# Patient Record
Sex: FEMALE | Race: OTHER | NOT HISPANIC OR LATINO
[De-identification: names, ages, dates, MRNs, and addresses within clinical notes are randomized per-mention and may not be internally consistent; named-entity substitution may affect disease eponyms.]

---

## 2020-05-06 ENCOUNTER — APPOINTMENT (OUTPATIENT)
Dept: ANTEPARTUM | Facility: CLINIC | Age: 40
End: 2020-05-06
Payer: MEDICAID

## 2020-05-06 ENCOUNTER — ASOB RESULT (OUTPATIENT)
Age: 40
End: 2020-05-06

## 2020-05-06 PROCEDURE — 76813 OB US NUCHAL MEAS 1 GEST: CPT

## 2020-05-06 PROCEDURE — 99201 OFFICE OUTPATIENT NEW 10 MINUTES: CPT | Mod: TH,25

## 2020-05-06 PROCEDURE — 76801 OB US < 14 WKS SINGLE FETUS: CPT

## 2020-05-06 PROCEDURE — 36416 COLLJ CAPILLARY BLOOD SPEC: CPT

## 2020-06-03 ENCOUNTER — ASOB RESULT (OUTPATIENT)
Age: 40
End: 2020-06-03

## 2020-06-03 ENCOUNTER — APPOINTMENT (OUTPATIENT)
Dept: ANTEPARTUM | Facility: CLINIC | Age: 40
End: 2020-06-03
Payer: MEDICAID

## 2020-06-03 PROBLEM — Z00.00 ENCOUNTER FOR PREVENTIVE HEALTH EXAMINATION: Status: ACTIVE | Noted: 2020-06-03

## 2020-06-03 PROCEDURE — 99213 OFFICE O/P EST LOW 20 MIN: CPT | Mod: TH,25

## 2020-06-03 PROCEDURE — 76815 OB US LIMITED FETUS(S): CPT

## 2020-07-01 ENCOUNTER — APPOINTMENT (OUTPATIENT)
Dept: ANTEPARTUM | Facility: CLINIC | Age: 40
End: 2020-07-01
Payer: MEDICAID

## 2020-07-01 ENCOUNTER — ASOB RESULT (OUTPATIENT)
Age: 40
End: 2020-07-01

## 2020-07-01 PROCEDURE — 76811 OB US DETAILED SNGL FETUS: CPT

## 2020-09-17 ENCOUNTER — ASOB RESULT (OUTPATIENT)
Age: 40
End: 2020-09-17

## 2020-09-17 ENCOUNTER — APPOINTMENT (OUTPATIENT)
Dept: ANTEPARTUM | Facility: CLINIC | Age: 40
End: 2020-09-17
Payer: MEDICAID

## 2020-09-17 PROCEDURE — 76819 FETAL BIOPHYS PROFIL W/O NST: CPT

## 2020-09-17 PROCEDURE — 76816 OB US FOLLOW-UP PER FETUS: CPT

## 2020-10-27 ENCOUNTER — OUTPATIENT (OUTPATIENT)
Dept: OUTPATIENT SERVICES | Facility: HOSPITAL | Age: 40
LOS: 1 days | End: 2020-10-27

## 2020-10-27 VITALS
HEIGHT: 64 IN | OXYGEN SATURATION: 99 % | RESPIRATION RATE: 16 BRPM | SYSTOLIC BLOOD PRESSURE: 120 MMHG | HEART RATE: 75 BPM | TEMPERATURE: 98 F | DIASTOLIC BLOOD PRESSURE: 80 MMHG | WEIGHT: 164.91 LBS

## 2020-10-27 DIAGNOSIS — Z98.891 HISTORY OF UTERINE SCAR FROM PREVIOUS SURGERY: Chronic | ICD-10-CM

## 2020-10-27 DIAGNOSIS — Z98.82 BREAST IMPLANT STATUS: Chronic | ICD-10-CM

## 2020-10-27 LAB
APPEARANCE UR: SIGNIFICANT CHANGE UP
BACTERIA # UR AUTO: HIGH
BILIRUB UR-MCNC: NEGATIVE — SIGNIFICANT CHANGE UP
BLD GP AB SCN SERPL QL: NEGATIVE — SIGNIFICANT CHANGE UP
BLOOD UR QL VISUAL: NEGATIVE — SIGNIFICANT CHANGE UP
COLOR SPEC: SIGNIFICANT CHANGE UP
GLUCOSE UR-MCNC: NEGATIVE — SIGNIFICANT CHANGE UP
HCT VFR BLD CALC: 36.2 % — SIGNIFICANT CHANGE UP (ref 34.5–45)
HGB BLD-MCNC: 11.3 G/DL — LOW (ref 11.5–15.5)
HYALINE CASTS # UR AUTO: SIGNIFICANT CHANGE UP
KETONES UR-MCNC: NEGATIVE — SIGNIFICANT CHANGE UP
LEUKOCYTE ESTERASE UR-ACNC: NEGATIVE — SIGNIFICANT CHANGE UP
MCHC RBC-ENTMCNC: 30.2 PG — SIGNIFICANT CHANGE UP (ref 27–34)
MCHC RBC-ENTMCNC: 31.2 % — LOW (ref 32–36)
MCV RBC AUTO: 96.8 FL — SIGNIFICANT CHANGE UP (ref 80–100)
NITRITE UR-MCNC: NEGATIVE — SIGNIFICANT CHANGE UP
NRBC # FLD: 0 K/UL — SIGNIFICANT CHANGE UP (ref 0–0)
PH UR: 6.5 — SIGNIFICANT CHANGE UP (ref 5–8)
PLATELET # BLD AUTO: 216 K/UL — SIGNIFICANT CHANGE UP (ref 150–400)
PMV BLD: 10.6 FL — SIGNIFICANT CHANGE UP (ref 7–13)
PROT UR-MCNC: NEGATIVE — SIGNIFICANT CHANGE UP
RBC # BLD: 3.74 M/UL — LOW (ref 3.8–5.2)
RBC # FLD: 13.3 % — SIGNIFICANT CHANGE UP (ref 10.3–14.5)
RBC CASTS # UR COMP ASSIST: SIGNIFICANT CHANGE UP (ref 0–?)
RH IG SCN BLD-IMP: POSITIVE — SIGNIFICANT CHANGE UP
SP GR SPEC: 1.01 — SIGNIFICANT CHANGE UP (ref 1–1.04)
SQUAMOUS # UR AUTO: SIGNIFICANT CHANGE UP
UROBILINOGEN FLD QL: NORMAL — SIGNIFICANT CHANGE UP
WBC # BLD: 6.31 K/UL — SIGNIFICANT CHANGE UP (ref 3.8–10.5)
WBC # FLD AUTO: 6.31 K/UL — SIGNIFICANT CHANGE UP (ref 3.8–10.5)
WBC UR QL: SIGNIFICANT CHANGE UP (ref 0–?)

## 2020-10-27 RX ORDER — OXYTOCIN 10 UNIT/ML
333.33 VIAL (ML) INJECTION
Qty: 20 | Refills: 0 | Status: DISCONTINUED | OUTPATIENT
Start: 2020-11-03 | End: 2020-11-03

## 2020-10-27 NOTE — OB PST NOTE - NSANTHOSAYNRD_GEN_A_CORE
No. ADAN screening performed.  STOP BANG Legend: 0-2 = LOW Risk; 3-4 = INTERMEDIATE Risk; 5-8 = HIGH Risk

## 2020-10-27 NOTE — OB PST NOTE - HISTORY OF PRESENT ILLNESS
40 y/o female 38 weeks pregnant,  38 y/o female 38 weeks pregnant, , [facility]IL[place] 11/10/2020 presents to PST for pre op evaluation in preparation for repeat  section on 2020  Pt denies abnormal vaginal bleeding or leaking  Reports positive fetal movement during PST

## 2020-10-27 NOTE — OB PST NOTE - NSHPPHYSICALEXAM_GEN_ALL_CORE
Constitutional: Well Developed, Well Groomed, Well Nourished, No Distress    Eyes: PERRL, EOMI, conjunctiva clear    Ears: Normal    Mouth & Gums: Normal, moist    Pharynx: No tenderness, discharge, or peritonsillar abscess    Tonsils: No Redness, discharge, tenderness, or swelling    Neck: Supple, no JVD, normal thyroid glands, no carotid bruits, no cervical vertebral or paraspinal tenderness    Breast: Normal shape, no masses, no tenderness, nipples normal, no nipple discharge    Back: Normal shape, ROM intact, strength intact, no vertebral tenderness    Respiratory: Airway patent, breath sounds equal, good air movement, respiration non-labored, clear to auscultation bilateral, no chest wall tenderness, no intercostal retractions, no rales, no wheezes, no rhonchi, no subcutaneous emphysema    Cardiovascular:  Regular rate and rhythm, no rubs or murmur, normal PMI    Gastrointestinal: Soft, non-tender, non distention, no masses palpable, bowel sound normal, no bruit, no rebound tenderness    Extremities: No clubbing, cyanosis, or pedal edema    Vascular:  Carotid Pulse normal , Radial Pulse normal, Femoral Pulse normal, DP pulse normal, PT pulse normal    Neurological: alert & oriented x 3, sensation intact, deep reflexes intact, cranial nerve intact, normal strength    Skin: warm and dry, normal color    Lymph Nodes: normal posterior cervical lymph node, normal anterior cervical lymph node, normal supraclavicular lymph node, normal axillary lymph node, normal inguinal lymph node, normal femoral lymph node    Musculoskeletal: BLE mild edema  ROM intact, warmth, or calf tenderness. Normal strength    Psychiatric: normal affect, normal behavior

## 2020-10-27 NOTE — OB PST NOTE - PROBLEM SELECTOR PLAN 1
Scheduled for repeat  section, bilateral tubal ligation on 2020. Pre op instructions, chlorhexidine gluconate soap given and explained. Pt verbalized understanding.   Pt confirmed appt for Covid-19 swab pre op

## 2020-10-27 NOTE — OB PST NOTE - NSHPREVIEWOFSYSTEMS_GEN_ALL_CORE
General: No fever, chills, sweating, anorexia, weight loss or weight gain. No polyphagia, polyurea, polydypsia, malaise, or fatigue    Skin: No rashes, itching, or dryness. No change in size/color of moles. No tumors, brittle nails, pitted nails, or hair loss    Breast: No tenderness, lumps, or nipple discharge      Ophthalmologic: No diplopia, photophobia, lacrimation, blurred Vision , or eye discharge    ENMT Symptoms: No hearing difficulty, ear pain, tinnitus, or vertigo. No sinus symptoms, nasal congestion, nasal   discharge, or nasal obstruction    Respiratory and Thorax: No wheezing, dyspnea, cough, hemoptysis, or pleuritic chest pPain     Cardiovascular: No chest pain, palpitations, dyspnea on exertion, orthopnea, paroxysmal nocturnal dyspnea,   peripheral edema, or claudication    Gastrointestinal: No nausea, vomiting, diarrhea, constipation, change in bowel habits, flatulence, abdominal pain, or melena    Genitourinary/ Pelvis: No hematuria, renal colic, or flank pain.  No urine discoloration, incontinence, dysuria, or urinary hesitancy. Normal urinary frequency. No nocturia, abnormal vaginal bleeding, vaginal discharge, spotting, pelvic pain, or vaginal leakage    Musculoskeletal: No arthralgia, arthritis, joint swelling, muscle cramping, muscle weakness, neck pain, arm pain, or leg pain    Neurological: No transient paralysis, weakness, paresthesias, or seizures. No syncope, tremors, vertigo, loss of sensation, difficulty walking, loss of consciousness, hemiparesis, confusion, or facial palsy    Psychiatric: No suicidal ideation, depression, anxiety, insomnia, memory loss, paranoia, mood swings, agitation, hallucinations, or hyperactivity    Hematology: No gum bleeding, nose bleeding, or skin lumps    Lymphatic: No enlarged or tender lymph nodes. No extremity swelling    Endocrine: No heat or cold intolerance    Immunologic: No recurrent or persistent infections General: No fever, chills, sweating, anorexia, weight loss or weight gain. No polyphagia, polyurea, polydypsia, malaise, or fatigue    Skin: No rashes, itching, or dryness. No change in size/color of moles. No tumors, brittle nails, pitted nails, or hair loss    Breast: No tenderness, lumps, or nipple discharge      Ophthalmologic: No diplopia, photophobia, lacrimation, blurred Vision , or eye discharge    ENMT Symptoms: No hearing difficulty, ear pain, tinnitus, or vertigo. No sinus symptoms, nasal congestion, nasal   discharge, or nasal obstruction    Respiratory and Thorax: No wheezing, dyspnea, cough, hemoptysis, or pleuritic chest pain     Cardiovascular: No chest pain, palpitations, dyspnea on exertion, orthopnea, paroxysmal nocturnal dyspnea,   peripheral edema, or claudication    Gastrointestinal: No nausea, vomiting, diarrhea, constipation, change in bowel habits, flatulence, abdominal pain, or melena    Genitourinary/ Pelvis: No hematuria, renal colic, or flank pain.  No urine discoloration, incontinence, dysuria, or urinary hesitancy. Normal urinary frequency. No nocturia, abnormal vaginal bleeding, vaginal discharge, spotting, pelvic pain, or vaginal leakage    Musculoskeletal: + varicose veins, No arthralgia, arthritis, joint swelling, muscle cramping, muscle weakness, neck pain, arm pain, or leg pain    Neurological: No transient paralysis, weakness, paresthesias, or seizures. No syncope, tremors, vertigo, loss of sensation, difficulty walking, loss of consciousness, hemiparesis, confusion, or facial palsy    Psychiatric: No suicidal ideation, depression, anxiety, insomnia, memory loss, paranoia, mood swings, agitation, hallucinations, or hyperactivity    Hematology: No gum bleeding, nose bleeding, or skin lumps    Lymphatic: No enlarged or tender lymph nodes. No extremity swelling    Endocrine: No heat or cold intolerance    Immunologic: No recurrent or persistent infections

## 2020-10-31 ENCOUNTER — APPOINTMENT (OUTPATIENT)
Dept: DISASTER EMERGENCY | Facility: CLINIC | Age: 40
End: 2020-10-31

## 2020-10-31 DIAGNOSIS — Z01.818 ENCOUNTER FOR OTHER PREPROCEDURAL EXAMINATION: ICD-10-CM

## 2020-11-01 LAB — SARS-COV-2 N GENE NPH QL NAA+PROBE: NOT DETECTED

## 2020-11-02 ENCOUNTER — TRANSCRIPTION ENCOUNTER (OUTPATIENT)
Age: 40
End: 2020-11-02

## 2020-11-03 ENCOUNTER — RESULT REVIEW (OUTPATIENT)
Age: 40
End: 2020-11-03

## 2020-11-03 ENCOUNTER — TRANSCRIPTION ENCOUNTER (OUTPATIENT)
Age: 40
End: 2020-11-03

## 2020-11-03 ENCOUNTER — INPATIENT (INPATIENT)
Facility: HOSPITAL | Age: 40
LOS: 1 days | Discharge: HOME | End: 2020-11-05
Attending: STUDENT IN AN ORGANIZED HEALTH CARE EDUCATION/TRAINING PROGRAM | Admitting: STUDENT IN AN ORGANIZED HEALTH CARE EDUCATION/TRAINING PROGRAM
Payer: MEDICAID

## 2020-11-03 VITALS — HEART RATE: 75 BPM | SYSTOLIC BLOOD PRESSURE: 133 MMHG | DIASTOLIC BLOOD PRESSURE: 83 MMHG

## 2020-11-03 DIAGNOSIS — Z98.891 HISTORY OF UTERINE SCAR FROM PREVIOUS SURGERY: Chronic | ICD-10-CM

## 2020-11-03 DIAGNOSIS — Z98.82 BREAST IMPLANT STATUS: Chronic | ICD-10-CM

## 2020-11-03 LAB
BASOPHILS # BLD AUTO: 0.02 K/UL — SIGNIFICANT CHANGE UP (ref 0–0.2)
BASOPHILS NFR BLD AUTO: 0.3 % — SIGNIFICANT CHANGE UP (ref 0–2)
BLD GP AB SCN SERPL QL: NEGATIVE — SIGNIFICANT CHANGE UP
EOSINOPHIL # BLD AUTO: 0.03 K/UL — SIGNIFICANT CHANGE UP (ref 0–0.5)
EOSINOPHIL NFR BLD AUTO: 0.4 % — SIGNIFICANT CHANGE UP (ref 0–6)
HCT VFR BLD CALC: 39.6 % — SIGNIFICANT CHANGE UP (ref 34.5–45)
HGB BLD-MCNC: 12.3 G/DL — SIGNIFICANT CHANGE UP (ref 11.5–15.5)
IMM GRANULOCYTES NFR BLD AUTO: 0.4 % — SIGNIFICANT CHANGE UP (ref 0–1.5)
LYMPHOCYTES # BLD AUTO: 1.17 K/UL — SIGNIFICANT CHANGE UP (ref 1–3.3)
LYMPHOCYTES # BLD AUTO: 15.8 % — SIGNIFICANT CHANGE UP (ref 13–44)
MCHC RBC-ENTMCNC: 29.9 PG — SIGNIFICANT CHANGE UP (ref 27–34)
MCHC RBC-ENTMCNC: 31.1 % — LOW (ref 32–36)
MCV RBC AUTO: 96.4 FL — SIGNIFICANT CHANGE UP (ref 80–100)
MONOCYTES # BLD AUTO: 0.4 K/UL — SIGNIFICANT CHANGE UP (ref 0–0.9)
MONOCYTES NFR BLD AUTO: 5.4 % — SIGNIFICANT CHANGE UP (ref 2–14)
NEUTROPHILS # BLD AUTO: 5.77 K/UL — SIGNIFICANT CHANGE UP (ref 1.8–7.4)
NEUTROPHILS NFR BLD AUTO: 77.7 % — HIGH (ref 43–77)
NRBC # FLD: 0 K/UL — SIGNIFICANT CHANGE UP (ref 0–0)
PLATELET # BLD AUTO: 215 K/UL — SIGNIFICANT CHANGE UP (ref 150–400)
PMV BLD: 10.3 FL — SIGNIFICANT CHANGE UP (ref 7–13)
RBC # BLD: 4.11 M/UL — SIGNIFICANT CHANGE UP (ref 3.8–5.2)
RBC # FLD: 13.3 % — SIGNIFICANT CHANGE UP (ref 10.3–14.5)
RH IG SCN BLD-IMP: POSITIVE — SIGNIFICANT CHANGE UP
T PALLIDUM AB TITR SER: NEGATIVE — SIGNIFICANT CHANGE UP
WBC # BLD: 7.42 K/UL — SIGNIFICANT CHANGE UP (ref 3.8–10.5)
WBC # FLD AUTO: 7.42 K/UL — SIGNIFICANT CHANGE UP (ref 3.8–10.5)

## 2020-11-03 PROCEDURE — 88302 TISSUE EXAM BY PATHOLOGIST: CPT | Mod: 26

## 2020-11-03 RX ORDER — CITRIC ACID/SODIUM CITRATE 300-500 MG
30 SOLUTION, ORAL ORAL ONCE
Refills: 0 | Status: COMPLETED | OUTPATIENT
Start: 2020-11-03 | End: 2020-11-03

## 2020-11-03 RX ORDER — FAMOTIDINE 10 MG/ML
20 INJECTION INTRAVENOUS ONCE
Refills: 0 | Status: COMPLETED | OUTPATIENT
Start: 2020-11-03 | End: 2020-11-03

## 2020-11-03 RX ORDER — HEPARIN SODIUM 5000 [USP'U]/ML
5000 INJECTION INTRAVENOUS; SUBCUTANEOUS EVERY 12 HOURS
Refills: 0 | Status: DISCONTINUED | OUTPATIENT
Start: 2020-11-03 | End: 2020-11-05

## 2020-11-03 RX ORDER — FERROUS SULFATE 325(65) MG
325 TABLET ORAL DAILY
Refills: 0 | Status: DISCONTINUED | OUTPATIENT
Start: 2020-11-03 | End: 2020-11-05

## 2020-11-03 RX ORDER — MAGNESIUM HYDROXIDE 400 MG/1
30 TABLET, CHEWABLE ORAL
Refills: 0 | Status: DISCONTINUED | OUTPATIENT
Start: 2020-11-03 | End: 2020-11-05

## 2020-11-03 RX ORDER — SIMETHICONE 80 MG/1
80 TABLET, CHEWABLE ORAL EVERY 4 HOURS
Refills: 0 | Status: DISCONTINUED | OUTPATIENT
Start: 2020-11-03 | End: 2020-11-05

## 2020-11-03 RX ORDER — LANOLIN
1 OINTMENT (GRAM) TOPICAL EVERY 6 HOURS
Refills: 0 | Status: DISCONTINUED | OUTPATIENT
Start: 2020-11-03 | End: 2020-11-05

## 2020-11-03 RX ORDER — SODIUM CHLORIDE 9 MG/ML
1000 INJECTION, SOLUTION INTRAVENOUS
Refills: 0 | Status: DISCONTINUED | OUTPATIENT
Start: 2020-11-03 | End: 2020-11-03

## 2020-11-03 RX ORDER — OXYCODONE HYDROCHLORIDE 5 MG/1
5 TABLET ORAL ONCE
Refills: 0 | Status: DISCONTINUED | OUTPATIENT
Start: 2020-11-03 | End: 2020-11-05

## 2020-11-03 RX ORDER — SODIUM CHLORIDE 9 MG/ML
1000 INJECTION, SOLUTION INTRAVENOUS ONCE
Refills: 0 | Status: COMPLETED | OUTPATIENT
Start: 2020-11-03 | End: 2020-11-03

## 2020-11-03 RX ORDER — CHOLECALCIFEROL (VITAMIN D3) 125 MCG
0 CAPSULE ORAL
Qty: 0 | Refills: 0 | DISCHARGE

## 2020-11-03 RX ORDER — IBUPROFEN 200 MG
600 TABLET ORAL EVERY 6 HOURS
Refills: 0 | Status: COMPLETED | OUTPATIENT
Start: 2020-11-03 | End: 2021-10-02

## 2020-11-03 RX ORDER — TETANUS TOXOID, REDUCED DIPHTHERIA TOXOID AND ACELLULAR PERTUSSIS VACCINE, ADSORBED 5; 2.5; 8; 8; 2.5 [IU]/.5ML; [IU]/.5ML; UG/.5ML; UG/.5ML; UG/.5ML
0.5 SUSPENSION INTRAMUSCULAR ONCE
Refills: 0 | Status: DISCONTINUED | OUTPATIENT
Start: 2020-11-03 | End: 2020-11-05

## 2020-11-03 RX ORDER — SENNA PLUS 8.6 MG/1
1 TABLET ORAL
Refills: 0 | Status: DISCONTINUED | OUTPATIENT
Start: 2020-11-03 | End: 2020-11-05

## 2020-11-03 RX ORDER — METOCLOPRAMIDE HCL 10 MG
10 TABLET ORAL ONCE
Refills: 0 | Status: COMPLETED | OUTPATIENT
Start: 2020-11-03 | End: 2020-11-03

## 2020-11-03 RX ORDER — KETOROLAC TROMETHAMINE 30 MG/ML
30 SYRINGE (ML) INJECTION EVERY 6 HOURS
Refills: 0 | Status: DISCONTINUED | OUTPATIENT
Start: 2020-11-03 | End: 2020-11-04

## 2020-11-03 RX ORDER — OXYCODONE HYDROCHLORIDE 5 MG/1
5 TABLET ORAL
Refills: 0 | Status: DISCONTINUED | OUTPATIENT
Start: 2020-11-03 | End: 2020-11-05

## 2020-11-03 RX ORDER — DIPHENHYDRAMINE HCL 50 MG
25 CAPSULE ORAL EVERY 6 HOURS
Refills: 0 | Status: DISCONTINUED | OUTPATIENT
Start: 2020-11-03 | End: 2020-11-05

## 2020-11-03 RX ORDER — OXYTOCIN 10 UNIT/ML
333.33 VIAL (ML) INJECTION
Qty: 20 | Refills: 0 | Status: DISCONTINUED | OUTPATIENT
Start: 2020-11-03 | End: 2020-11-04

## 2020-11-03 RX ORDER — ACETAMINOPHEN 500 MG
975 TABLET ORAL
Refills: 0 | Status: DISCONTINUED | OUTPATIENT
Start: 2020-11-03 | End: 2020-11-05

## 2020-11-03 RX ADMIN — Medication 975 MILLIGRAM(S): at 23:28

## 2020-11-03 RX ADMIN — Medication 30 MILLIGRAM(S): at 19:33

## 2020-11-03 RX ADMIN — Medication 10 MILLIGRAM(S): at 11:50

## 2020-11-03 RX ADMIN — SODIUM CHLORIDE 125 MILLILITER(S): 9 INJECTION, SOLUTION INTRAVENOUS at 11:51

## 2020-11-03 RX ADMIN — FAMOTIDINE 20 MILLIGRAM(S): 10 INJECTION INTRAVENOUS at 11:51

## 2020-11-03 RX ADMIN — Medication 30 MILLILITER(S): at 11:50

## 2020-11-03 RX ADMIN — SODIUM CHLORIDE 2000 MILLILITER(S): 9 INJECTION, SOLUTION INTRAVENOUS at 11:51

## 2020-11-03 NOTE — OB NEONATOLOGY/PEDIATRICIAN DELIVERY SUMMARY - NSPEDSNEONOTESA_OBGYN_ALL_OB_FT
39 wk male born via repeat CS to a 38 y/o  blood type O+ mother. Maternal history of breast augmentation. No significant prenatal history. PNL -/-/NR/I, GBS unknown. AROM at 1412 with clear fluids. Baby emerged vigorous, crying, was w/d/s/s with APGARS of 7/8. Mom plans to initiate breastfeeding, consents Hep B vaccine and declines circ. EOS 0.03. 39 wk male born via repeat CS to a 38 y/o  blood type O+ mother. Maternal history of breast augmentation. No significant prenatal history. PNL -/-/NR/I, GBS unknown. AROM at 1412 with clear fluids. Baby emerged with poor tone, minimal crying, was w/d/s/s with APGARS of 7/8. Mom plans to initiate breastfeeding, consents Hep B vaccine and declines circ. EOS 0.03.

## 2020-11-03 NOTE — DISCHARGE NOTE OB - PATIENT PORTAL LINK FT
You can access the FollowMyHealth Patient Portal offered by [facility] by registering at the following website: [email or web address]. By joining [employer]’s FollowMyHealth portal, you will also be able to view your health information using other applications (apps) compatible with our system.

## 2020-11-03 NOTE — DISCHARGE NOTE OB - MEDICATION SUMMARY - MEDICATIONS TO TAKE
I will START or STAY ON the medications listed below when I get home from the hospital:    prenatal  -- 2 gummies by mouth daily  -- Indication: For postpartum   I will START or STAY ON the medications listed below when I get home from the hospital:    prenatal  -- 2 gummies by mouth daily  -- Indication: For postpartum    ibuprofen 600 mg oral tablet  -- 1 tab(s) by mouth every 6 hours  -- Indication: For pain, as needed

## 2020-11-03 NOTE — OB PROVIDER DELIVERY SUMMARY - NSPROVIDERDELIVERYNOTE_OBGYN_ALL_OB_FT
repeat LTCS, bilateral tubal ligation   viable male infant, vertex presentation, head extedned at delivery, difficulty delivering  2970 Apgars 7/8, cord gasses sent  Grossly normal fallopian tubes, uterus, and ovaries    QBL: 476   IVF: 1700  UOP: 225    [facility] PGY2  w/ Dr. Spence Male  delivered in Vertex position, APGARS 7/8, 2970g. Difficult delivery.  noted to be in direct OP position with fetal head extension. Delivery achieved with flexion of the head. Adhesions noted between the anterior abdominal wall and the anterior aspect of the uterus. Unable to exteriorize the uterus. Hysterotomy closed with 1 layer and figure of eight sutures were performed for hemostasis. Patient noted to have persistent oozing and received TXA. Modified Aj technique performed. Ends of the fallopian tubes were suture ligated and cauterized. To placed over Hysterotomy for added Hemostasis.     QBL: 476   IVF: 1700  UOP: 225

## 2020-11-03 NOTE — OB PROVIDER H&P - ASSESSMENT
39y , EGA@39 weeks, presented for scheduled repeat  section with BTL.  Patient denies contractions//vaginal bleeding/ leakage of fluid, and reports fetal movement.  Denies fever, chills, headaches, changes in vision, chest pain, palpitations, shortness of breath, cough, nausea, vomiting, diarrhea, constipation, urinary symptoms, edema.    Prenatal care with Dr. Fish.  Prenatal course is uncomplicated.  GBS status is negative.  No adverse reactions to anesthesia, no objections to blood transfusions if clinically indicated.  OB hx: 2020, primary  sections, twins, 38 weeks, boys, [id number]; uncomplicated.            2 TOPs, D&C, uncomplicated  Med hx: denies  Surg hx: ,  section, 2018, breat augmentation  GYN hx: reports small uterine fibroid, denies hx of STDs/abnormal Pap smear  Meds: Prenatal vitamins, FeSO4, Vit V  Allergies; NKDA  Social hx: Denies alcohol, tobacco, drug use    PHYSICAL EXAM  Vital Signs: T(C): 36.9C; HR: 75 (2020 11:11); BP: 133/71  RR: 17  Gen: NAD  Head: NC/AT  Cardio: S1S2+, RRR  Resp: CTABL, no wheezing  Abdomen: Soft, NT/ND, BS+  Extremities: No LE edema bilaterally    FHR: 130 HR baseline, moderate variability, accelerations present, no decelerations, Category 1.  [place]: acontractile        A:  39y , EGA@39 weeks, not in labor; previous  section.     Plan: admit for repeat  section with BTL.    Betty Aden CNM     20 @ 11:15

## 2020-11-03 NOTE — DISCHARGE NOTE OB - CARE PROVIDER_API CALL
Rekha Spence)  Obstetrics and Gynecology Obstetrics and Gynocology  [address] [place] [address]  Phone: [phone]  Fax: [phone]  Established Patient  Follow Up Time: 2 weeks

## 2020-11-03 NOTE — OB RN PATIENT PROFILE - PRO PATERNAL LANGUAGE ASSIST YN
will have his wife translate if he needs an explanation as per /No- Second parent offered and refused free interpretation services

## 2020-11-04 LAB
BASOPHILS # BLD AUTO: 0.01 K/UL — SIGNIFICANT CHANGE UP (ref 0–0.2)
BASOPHILS NFR BLD AUTO: 0.1 % — SIGNIFICANT CHANGE UP (ref 0–2)
EOSINOPHIL # BLD AUTO: 0.01 K/UL — SIGNIFICANT CHANGE UP (ref 0–0.5)
EOSINOPHIL NFR BLD AUTO: 0.1 % — SIGNIFICANT CHANGE UP (ref 0–6)
HCT VFR BLD CALC: 35.1 % — SIGNIFICANT CHANGE UP (ref 34.5–45)
HGB BLD-MCNC: 10.8 G/DL — LOW (ref 11.5–15.5)
IMM GRANULOCYTES NFR BLD AUTO: 0.4 % — SIGNIFICANT CHANGE UP (ref 0–1.5)
LYMPHOCYTES # BLD AUTO: 0.94 K/UL — LOW (ref 1–3.3)
LYMPHOCYTES # BLD AUTO: 8.4 % — LOW (ref 13–44)
MCHC RBC-ENTMCNC: 29.5 PG — SIGNIFICANT CHANGE UP (ref 27–34)
MCHC RBC-ENTMCNC: 30.8 % — LOW (ref 32–36)
MCV RBC AUTO: 95.9 FL — SIGNIFICANT CHANGE UP (ref 80–100)
MONOCYTES # BLD AUTO: 0.86 K/UL — SIGNIFICANT CHANGE UP (ref 0–0.9)
MONOCYTES NFR BLD AUTO: 7.7 % — SIGNIFICANT CHANGE UP (ref 2–14)
NEUTROPHILS # BLD AUTO: 9.34 K/UL — HIGH (ref 1.8–7.4)
NEUTROPHILS NFR BLD AUTO: 83.3 % — HIGH (ref 43–77)
NRBC # FLD: 0 K/UL — SIGNIFICANT CHANGE UP (ref 0–0)
PLATELET # BLD AUTO: 210 K/UL — SIGNIFICANT CHANGE UP (ref 150–400)
PMV BLD: 10.5 FL — SIGNIFICANT CHANGE UP (ref 7–13)
RBC # BLD: 3.66 M/UL — LOW (ref 3.8–5.2)
RBC # FLD: 13.2 % — SIGNIFICANT CHANGE UP (ref 10.3–14.5)
SARS-COV-2 IGG SERPL QL IA: NEGATIVE — SIGNIFICANT CHANGE UP
SARS-COV-2 IGM SERPL IA-ACNC: <0.1 INDEX — SIGNIFICANT CHANGE UP
WBC # BLD: 11.2 K/UL — HIGH (ref 3.8–10.5)
WBC # FLD AUTO: 11.2 K/UL — HIGH (ref 3.8–10.5)

## 2020-11-04 RX ORDER — IBUPROFEN 200 MG
600 TABLET ORAL EVERY 6 HOURS
Refills: 0 | Status: DISCONTINUED | OUTPATIENT
Start: 2020-11-04 | End: 2020-11-05

## 2020-11-04 RX ORDER — IBUPROFEN 200 MG
1 TABLET ORAL
Qty: 0 | Refills: 0 | DISCHARGE
Start: 2020-11-04

## 2020-11-04 RX ADMIN — Medication 975 MILLIGRAM(S): at 23:00

## 2020-11-04 RX ADMIN — Medication 600 MILLIGRAM(S): at 17:35

## 2020-11-04 RX ADMIN — Medication 30 MILLIGRAM(S): at 05:58

## 2020-11-04 RX ADMIN — Medication 975 MILLIGRAM(S): at 00:15

## 2020-11-04 RX ADMIN — Medication 975 MILLIGRAM(S): at 22:17

## 2020-11-04 RX ADMIN — Medication 325 MILLIGRAM(S): at 12:14

## 2020-11-04 RX ADMIN — Medication 975 MILLIGRAM(S): at 12:45

## 2020-11-04 RX ADMIN — Medication 600 MILLIGRAM(S): at 18:05

## 2020-11-04 RX ADMIN — Medication 1 TABLET(S): at 12:14

## 2020-11-04 RX ADMIN — Medication 975 MILLIGRAM(S): at 06:40

## 2020-11-04 RX ADMIN — Medication 30 MILLIGRAM(S): at 06:40

## 2020-11-04 RX ADMIN — Medication 30 MILLIGRAM(S): at 12:15

## 2020-11-04 RX ADMIN — Medication 975 MILLIGRAM(S): at 05:57

## 2020-11-04 RX ADMIN — Medication 30 MILLIGRAM(S): at 12:45

## 2020-11-04 RX ADMIN — Medication 975 MILLIGRAM(S): at 12:15

## 2020-11-04 NOTE — LACTATION INITIAL EVALUATION - LACTATION INTERVENTIONS
initiate hand expression routine/assisted with deep latch and positioning.  reviewed with  mother breastfeeding section  of  postpartum  book.  discussed  signs  of  effective  feeding and  swallowing.  discussed  compression at  breast when  nbn  stops  drinking  and  is  still sucking. instructed  to offer both  breast at a feeding ,feed on cue and safe  skin to skin.  reviewed risks  of  formula  feedng./initiate skin to skin

## 2020-11-04 NOTE — PROGRESS NOTE ADULT - SUBJECTIVE AND OBJECTIVE BOX
Patient seen and examined at bedside, no acute overnight events. No acute concerns, pain well controlled. Patient is ambulating and tolerating regular diet. Has not yet passed flatus. Pt has not yet urinated after Smith was removed. Did have straight catheterization after failed TOV. Denies CP, SOB, N/V, HA, blurred vision, epigastric pain.    Vital Signs Last 24 Hours  T(C): 36.6 (11-04-20 @ 05:51), Max: 36.9 (11-03-20 @ 11:51)  HR: 70 (11-04-20 @ 05:51) (69 - 90)  BP: 120/80 (11-04-20 @ 05:51) (107/94 - 140/77)  RR: 18 (11-04-20 @ 05:51) (15 - 20)  SpO2: 99% (11-04-20 @ 05:51) (98% - 100%)    I&O's Summary    03 Nov 2020 07:01  -  04 Nov 2020 07:00  --------------------------------------------------------  IN: 4456 mL / OUT: 951 mL / NET: 3505 mL        Physical Exam:  General: NAD  Abdomen: Soft, non-tender, non-distended, fundus firm  Incision: Pfannensteil incision CDI, subcuticular suture closure  Pelvic: Lochia wnl    Labs:    Blood Type: O Positive  Antibody Screen: Negative  RPR: Negative               10.8   11.20 )-----------( 210      ( 11-04 @ 05:35 )             35.1                12.3   7.42  )-----------( 215      ( 11-03 @ 11:23 )             39.6                11.3   6.31  )-----------( 216      ( 10-27 @ 18:27 )             36.2         MEDICATIONS  (STANDING):  acetaminophen   Tablet .. 975 milliGRAM(s) Oral <User Schedule>  diphtheria/tetanus/pertussis (acellular) Vaccine (ADAcel) 0.5 milliLiter(s) IntraMuscular once  ferrous    sulfate 325 milliGRAM(s) Oral daily  heparin   Injectable 5000 Unit(s) SubCutaneous every 12 hours  ibuprofen  Tablet. 600 milliGRAM(s) Oral every 6 hours  ketorolac   Injectable 30 milliGRAM(s) IV Push every 6 hours  oxytocin Infusion 333.333 milliUNIT(s)/Min (1000 mL/Hr) IV Continuous <Continuous>  prenatal multivitamin 1 Tablet(s) Oral daily    MEDICATIONS  (PRN):  diphenhydrAMINE 25 milliGRAM(s) Oral every 6 hours PRN Itching  lanolin Ointment 1 Application(s) Topical every 6 hours PRN Sore Nipples  magnesium hydroxide Suspension 30 milliLiter(s) Oral two times a day PRN Constipation  oxyCODONE    IR 5 milliGRAM(s) Oral every 3 hours PRN Moderate to Severe Pain (4-10)  oxyCODONE    IR 5 milliGRAM(s) Oral once PRN Moderate to Severe Pain (4-10)  senna 1 Tablet(s) Oral two times a day PRN Constipation  simethicone 80 milliGRAM(s) Chew every 4 hours PRN Gas

## 2020-11-04 NOTE — PROGRESS NOTE ADULT - SUBJECTIVE AND OBJECTIVE BOX
Pain Service Follow-up  Postop Day  1    S/P  C- Section    T(C): 36.6 (11-04-20 @ 05:51), Max: 36.9 (11-03-20 @ 11:51)  HR: 70 (11-04-20 @ 05:51) (69 - 90)  BP: 120/80 (11-04-20 @ 05:51) (107/94 - 140/77)  RR: 18 (11-04-20 @ 05:51) (15 - 20)  SpO2: 99% (11-04-20 @ 05:51) (98% - 100%)  Wt(kg): --      THERAPY:  Spinal Morphine     Sedation Score:	  [X] Alert	      [  ] Drowsy       [  ] Arousable	[  ] Asleep         [  ] Unresponsive    Side Effects:	  [X] None	      [  ] Nausea       [  ] Pruritus        [  ] Weakness   [  ] Numbness        ASSESSMENT/ PLAN   [ X ] Discontinue         [  ] Continue    [ X ] Documentation and Verification of current medications       Satisfactory Post Anesthetic Course

## 2020-11-04 NOTE — LACTATION INITIAL EVALUATION - INTERVENTION OUTCOME
nbn  sleepy at breast .reviewed strategies to wake  nbn . nbn  less than 24  hours .  rn aware of visit .  offered assistance as needed./verbalizes understanding

## 2020-11-05 VITALS
TEMPERATURE: 98 F | RESPIRATION RATE: 18 BRPM | HEART RATE: 80 BPM | DIASTOLIC BLOOD PRESSURE: 76 MMHG | OXYGEN SATURATION: 100 % | SYSTOLIC BLOOD PRESSURE: 124 MMHG

## 2020-11-05 PROCEDURE — 93970 EXTREMITY STUDY: CPT | Mod: 26

## 2020-11-05 RX ADMIN — Medication 600 MILLIGRAM(S): at 07:05

## 2020-11-05 RX ADMIN — Medication 600 MILLIGRAM(S): at 11:15

## 2020-11-05 RX ADMIN — Medication 600 MILLIGRAM(S): at 06:00

## 2020-11-05 RX ADMIN — Medication 600 MILLIGRAM(S): at 11:45

## 2020-11-05 NOTE — PROGRESS NOTE ADULT - SUBJECTIVE AND OBJECTIVE BOX
NP Provider note:    Patient seen at bedside resting comfortably offers no new complaints. + Ambulation, + void without difficulty, + flatus;  no bm; tolerating regular diet. both breastfeeding and bottle feeding. Bonding well w/ . PT complains of Left leg swelling, and heaviness. Denies pain and weakness to extremity. Denies HA, blurry vision or epigastric pain, CP, SOB, N/V/D,  dizziness, palpitations, worsening vaginal bleeding.    Vital Signs Last 24 Hrs  T(C): 36.9 (2020 05:12), Max: 36.9 (2020 05:12)  T(F): 98.5 (2020 05:12), Max: 98.5 (2020 05:12)  HR: 73 (2020 05:12) (73 - 86)  BP: 118/65 (2020 05:12) (118/65 - 125/69)  BP(mean): --  RR: 18 (2020 05:12) (18 - 18)  SpO2: 100% (2020 05:12) (98% - 100%)    Gen: A&O x 3, NAD  Chest: CTABL  Cardiac: S1, S2, RRR  Breast: Soft, nontender, nonengorged  Abdomen: +BS; soft; Nontender, nondistended, Incision C/D/I steri strips in place   Gyn: Minimal lochia  Extremities: Nontender, DTRS 2+, Left lower extremity swelling noted more on the left leg than the right leg, Mild pitting edema to left leg (+1), negative homans sign. Normal strength and ROM bilaterally                          10.8   11.20 )-----------( 210      ( 2020 05:35 )             35.1       A/P: POD #2 s/p c/s   -Pain management as needed  -cont post op care  -OOB and ambulate  - reviewed CBC   -encourage insentive spirometer use  -Encourage breastfeeding   - B/L venous doppler ordered and Pending  - discharge pending the results of the doppler  -d/w dr Brown

## 2020-11-05 NOTE — PROGRESS NOTE ADULT - PROBLEM SELECTOR PLAN 1
Routine PP care
- Continue with po analgesia  - Increase ambulation  - Continue regular diet  - d/c IV fluids  - AM CBC shows appropriate drop in H/H  - Continue to monitor I/Os   - Incision dressing removed    Marisabel Rice, PGY-1

## 2020-12-24 NOTE — OB RN PATIENT PROFILE - NSWEIGHTCALCTOOLDRUG_GEN_A_CORE
Group A Streptococcus PCR is NEGATIVE  No treatment or change in treatment [facility] ED lab result protocol - Strep protocol.  used

## 2022-02-10 NOTE — OB PST NOTE - SPO2 (%)
Addendum  created 02/10/22 1338 by Zuleyka Mandel MD    Intraprocedure Meds edited, Orders acknowledged in Narrator       99
